# Patient Record
Sex: FEMALE | Race: WHITE
[De-identification: names, ages, dates, MRNs, and addresses within clinical notes are randomized per-mention and may not be internally consistent; named-entity substitution may affect disease eponyms.]

---

## 2018-04-29 ENCOUNTER — HOSPITAL ENCOUNTER (EMERGENCY)
Dept: HOSPITAL 15 - ER | Age: 83
LOS: 1 days | Discharge: HOME | End: 2018-04-30
Payer: COMMERCIAL

## 2018-04-29 VITALS — HEIGHT: 62 IN | WEIGHT: 173.5 LBS | BODY MASS INDEX: 31.93 KG/M2

## 2018-04-29 DIAGNOSIS — M47.814: Primary | ICD-10-CM

## 2018-04-29 DIAGNOSIS — M54.42: ICD-10-CM

## 2018-04-29 DIAGNOSIS — J44.9: ICD-10-CM

## 2018-04-29 LAB
ALBUMIN SERPL-MCNC: 3.9 G/DL (ref 3.4–5)
ALP SERPL-CCNC: 140 U/L (ref 45–117)
ALT SERPL-CCNC: 27 U/L (ref 13–56)
ANION GAP SERPL CALCULATED.3IONS-SCNC: 11 MMOL/L (ref 5–15)
APTT PPP: 24.9 SEC (ref 22.64–33.71)
BILIRUB SERPL-MCNC: 0.7 MG/DL (ref 0.2–1)
BUN SERPL-MCNC: 16 MG/DL (ref 7–18)
BUN/CREAT SERPL: 16.5
CALCIUM SERPL-MCNC: 8.9 MG/DL (ref 8.5–10.1)
CHLORIDE SERPL-SCNC: 111 MMOL/L (ref 98–107)
CO2 SERPL-SCNC: 21 MMOL/L (ref 21–32)
GLUCOSE SERPL-MCNC: 138 MG/DL (ref 74–106)
HCT VFR BLD AUTO: 41.8 % (ref 36–46)
HGB BLD-MCNC: 14.6 G/DL (ref 12.2–16.2)
INR PPP: 0.94 (ref 0.9–1.15)
MAGNESIUM SERPL-MCNC: 2.3 MG/DL (ref 1.6–2.6)
MCH RBC QN AUTO: 34 PG (ref 28–32)
MCV RBC AUTO: 97.5 FL (ref 80–100)
NRBC BLD QL AUTO: 0 %
POTASSIUM SERPL-SCNC: 3.6 MMOL/L (ref 3.5–5.1)
PROT SERPL-MCNC: 7.6 G/DL (ref 6.4–8.2)
PROTHROMBIN TIME: 10.2 SEC (ref 9.37–12.3)
SODIUM SERPL-SCNC: 143 MMOL/L (ref 136–145)

## 2018-04-29 PROCEDURE — 99285 EMERGENCY DEPT VISIT HI MDM: CPT

## 2018-04-29 PROCEDURE — 36415 COLL VENOUS BLD VENIPUNCTURE: CPT

## 2018-04-29 PROCEDURE — 85025 COMPLETE CBC W/AUTO DIFF WBC: CPT

## 2018-04-29 PROCEDURE — 85730 THROMBOPLASTIN TIME PARTIAL: CPT

## 2018-04-29 PROCEDURE — 83735 ASSAY OF MAGNESIUM: CPT

## 2018-04-29 PROCEDURE — 93005 ELECTROCARDIOGRAM TRACING: CPT

## 2018-04-29 PROCEDURE — 72131 CT LUMBAR SPINE W/O DYE: CPT

## 2018-04-29 PROCEDURE — 84484 ASSAY OF TROPONIN QUANT: CPT

## 2018-04-29 PROCEDURE — 85610 PROTHROMBIN TIME: CPT

## 2018-04-29 PROCEDURE — 83880 ASSAY OF NATRIURETIC PEPTIDE: CPT

## 2018-04-29 PROCEDURE — 73030 X-RAY EXAM OF SHOULDER: CPT

## 2018-04-29 PROCEDURE — 96372 THER/PROPH/DIAG INJ SC/IM: CPT

## 2018-04-29 PROCEDURE — 71045 X-RAY EXAM CHEST 1 VIEW: CPT

## 2018-04-29 PROCEDURE — 72128 CT CHEST SPINE W/O DYE: CPT

## 2018-04-29 PROCEDURE — 80053 COMPREHEN METABOLIC PANEL: CPT

## 2018-04-30 VITALS — DIASTOLIC BLOOD PRESSURE: 37 MMHG | SYSTOLIC BLOOD PRESSURE: 111 MMHG

## 2025-01-20 ENCOUNTER — HOSPITAL ENCOUNTER (INPATIENT)
Dept: HOSPITAL 15 - SUR | Age: 89
LOS: 4 days | Discharge: HOME HEALTH | DRG: 470 | End: 2025-01-24
Attending: ORTHOPAEDIC SURGERY | Admitting: ORTHOPAEDIC SURGERY
Payer: COMMERCIAL

## 2025-01-20 VITALS
RESPIRATION RATE: 19 BRPM | TEMPERATURE: 98.2 F | OXYGEN SATURATION: 92 % | SYSTOLIC BLOOD PRESSURE: 131 MMHG | HEART RATE: 96 BPM | DIASTOLIC BLOOD PRESSURE: 69 MMHG

## 2025-01-20 VITALS — RESPIRATION RATE: 16 BRPM | OXYGEN SATURATION: 97 % | HEART RATE: 82 BPM

## 2025-01-20 VITALS
RESPIRATION RATE: 18 BRPM | DIASTOLIC BLOOD PRESSURE: 73 MMHG | SYSTOLIC BLOOD PRESSURE: 125 MMHG | TEMPERATURE: 97.2 F | OXYGEN SATURATION: 97 % | HEART RATE: 84 BPM

## 2025-01-20 VITALS — HEIGHT: 59 IN | WEIGHT: 158.51 LBS | BODY MASS INDEX: 31.96 KG/M2

## 2025-01-20 VITALS
SYSTOLIC BLOOD PRESSURE: 134 MMHG | DIASTOLIC BLOOD PRESSURE: 64 MMHG | HEART RATE: 85 BPM | OXYGEN SATURATION: 95 % | RESPIRATION RATE: 18 BRPM | TEMPERATURE: 97.3 F

## 2025-01-20 DIAGNOSIS — Z79.899: ICD-10-CM

## 2025-01-20 DIAGNOSIS — M16.12: Primary | ICD-10-CM

## 2025-01-20 DIAGNOSIS — Z79.82: ICD-10-CM

## 2025-01-20 PROCEDURE — 97116 GAIT TRAINING THERAPY: CPT

## 2025-01-20 PROCEDURE — 85014 HEMATOCRIT: CPT

## 2025-01-20 PROCEDURE — 85018 HEMOGLOBIN: CPT

## 2025-01-20 PROCEDURE — 86850 RBC ANTIBODY SCREEN: CPT

## 2025-01-20 PROCEDURE — 97110 THERAPEUTIC EXERCISES: CPT

## 2025-01-20 PROCEDURE — 8E0YXBF COMPUTER ASSISTED PROCEDURE OF LOWER EXTREMITY, WITH FLUOROSCOPY: ICD-10-PCS | Performed by: ORTHOPAEDIC SURGERY

## 2025-01-20 PROCEDURE — 80053 COMPREHEN METABOLIC PANEL: CPT

## 2025-01-20 PROCEDURE — 72170 X-RAY EXAM OF PELVIS: CPT

## 2025-01-20 PROCEDURE — 0SRB06Z REPLACEMENT OF LEFT HIP JOINT WITH OXIDIZED ZIRCONIUM ON POLYETHYLENE SYNTHETIC SUBSTITUTE, OPEN APPROACH: ICD-10-PCS | Performed by: ORTHOPAEDIC SURGERY

## 2025-01-20 PROCEDURE — 36415 COLL VENOUS BLD VENIPUNCTURE: CPT

## 2025-01-20 PROCEDURE — 86900 BLOOD TYPING SEROLOGIC ABO: CPT

## 2025-01-20 PROCEDURE — 97163 PT EVAL HIGH COMPLEX 45 MIN: CPT

## 2025-01-20 PROCEDURE — 86901 BLOOD TYPING SEROLOGIC RH(D): CPT

## 2025-01-20 PROCEDURE — 97530 THERAPEUTIC ACTIVITIES: CPT

## 2025-01-20 RX ADMIN — CEFEPIME ONE MLS/HR: 1 INJECTION, POWDER, FOR SOLUTION INTRAMUSCULAR; INTRAVENOUS at 07:43

## 2025-01-20 RX ADMIN — HYDROMORPHONE HYDROCHLORIDE PRN MG: 2 INJECTION, SOLUTION INTRAMUSCULAR; INTRAVENOUS; SUBCUTANEOUS at 11:13

## 2025-01-20 RX ADMIN — KETOROLAC TROMETHAMINE ONE MG: 30 INJECTION, SOLUTION INTRAMUSCULAR; INTRAVENOUS at 09:34

## 2025-01-20 RX ADMIN — HYDROMORPHONE HYDROCHLORIDE PRN MG: 2 INJECTION, SOLUTION INTRAMUSCULAR; INTRAVENOUS; SUBCUTANEOUS at 16:35

## 2025-01-20 RX ADMIN — PROPRANOLOL HYDROCHLORIDE SCH MG: 20 TABLET ORAL at 10:00

## 2025-01-20 RX ADMIN — HYDROCODONE BITARTRATE AND ACETAMINOPHEN PRN TAB: 5; 325 TABLET ORAL at 23:09

## 2025-01-20 RX ADMIN — CEFAZOLIN ONE MLS/HR: 2 INJECTION, POWDER, FOR SOLUTION INTRAMUSCULAR; INTRAVENOUS at 07:04

## 2025-01-20 RX ADMIN — ONDANSETRON HYDROCHLORIDE PRN MG: 2 INJECTION, SOLUTION INTRAMUSCULAR; INTRAVENOUS at 16:36

## 2025-01-20 RX ADMIN — MORPHINE SULFATE ONE MG: 0.5 INJECTION EPIDURAL; INTRATHECAL; INTRAVENOUS at 09:35

## 2025-01-20 RX ADMIN — SODIUM CHLORIDE SCH ML: 9 INJECTION INTRAMUSCULAR; INTRAVENOUS; SUBCUTANEOUS at 14:00

## 2025-01-20 RX ADMIN — DOCUSATE SODIUM SCH MG: 100 CAPSULE, LIQUID FILLED ORAL at 10:00

## 2025-01-20 RX ADMIN — ROPIVACAINE HYDROCHLORIDE ONE MG: 5 INJECTION, SOLUTION EPIDURAL; INFILTRATION; PERINEURAL at 09:23

## 2025-01-20 RX ADMIN — BUPIVACAINE HYDROCHLORIDE ONE ML: 2.5 INJECTION, SOLUTION INFILTRATION; PERINEURAL at 09:33

## 2025-01-20 RX ADMIN — CEFEPIME SCH MLS/HR: 1 INJECTION, POWDER, FOR SOLUTION INTRAMUSCULAR; INTRAVENOUS at 10:00

## 2025-01-20 RX ADMIN — SODIUM CHLORIDE, SODIUM LACTATE, POTASSIUM CHLORIDE, AND CALCIUM CHLORIDE SCH MLS/HR: .6; .31; .03; .02 INJECTION, SOLUTION INTRAVENOUS at 10:30

## 2025-01-20 RX ADMIN — ONDANSETRON HYDROCHLORIDE ONE MG: 2 INJECTION, SOLUTION INTRAMUSCULAR; INTRAVENOUS at 11:04

## 2025-01-20 RX ADMIN — ATORVASTATIN CALCIUM SCH MG: 20 TABLET, FILM COATED ORAL at 23:09

## 2025-01-20 RX ADMIN — SODIUM CHLORIDE ONE MLS/HR: 9 INJECTION, SOLUTION INTRAVENOUS at 06:51

## 2025-01-20 RX ADMIN — LATANOPROST SCH DROP: 50 SOLUTION/ DROPS OPHTHALMIC at 17:57

## 2025-01-20 RX ADMIN — CEFAZOLIN SODIUM SCH MLS/HR: 1 INJECTION, SOLUTION INTRAVENOUS at 08:45

## 2025-01-20 RX ADMIN — DEXTROSE ONE MG: 5 SOLUTION INTRAVENOUS at 09:35

## 2025-01-20 NOTE — DVH
EXAM: XY PELVIS AP



CLINICAL INDICATION: sp Left MONCHO



TECHNIQUE:    XY PELVIS AP



Comparison: None



FINDINGS/IMPRESSION:



There is no evidence of acute fracture or dislocation.



Left hip arthroplasty. Severe right hip osteoarthritis



The alignment is anatomical.



There is no radiopaque foreign body.



Electronically Signed by: Vinod Pineda at 01/20/2025 10:04:18 AM

## 2025-01-21 VITALS
DIASTOLIC BLOOD PRESSURE: 57 MMHG | HEART RATE: 90 BPM | RESPIRATION RATE: 18 BRPM | TEMPERATURE: 98.4 F | OXYGEN SATURATION: 94 % | SYSTOLIC BLOOD PRESSURE: 125 MMHG

## 2025-01-21 VITALS
OXYGEN SATURATION: 92 % | RESPIRATION RATE: 16 BRPM | TEMPERATURE: 99 F | HEART RATE: 83 BPM | SYSTOLIC BLOOD PRESSURE: 46 MMHG

## 2025-01-21 VITALS
RESPIRATION RATE: 17 BRPM | TEMPERATURE: 99.2 F | OXYGEN SATURATION: 96 % | DIASTOLIC BLOOD PRESSURE: 38 MMHG | HEART RATE: 83 BPM | SYSTOLIC BLOOD PRESSURE: 112 MMHG

## 2025-01-21 VITALS
RESPIRATION RATE: 19 BRPM | DIASTOLIC BLOOD PRESSURE: 45 MMHG | SYSTOLIC BLOOD PRESSURE: 118 MMHG | HEART RATE: 74 BPM | TEMPERATURE: 98.3 F | OXYGEN SATURATION: 92 %

## 2025-01-21 VITALS
SYSTOLIC BLOOD PRESSURE: 129 MMHG | OXYGEN SATURATION: 93 % | TEMPERATURE: 99 F | DIASTOLIC BLOOD PRESSURE: 50 MMHG | RESPIRATION RATE: 17 BRPM | HEART RATE: 74 BPM

## 2025-01-21 VITALS
HEART RATE: 95 BPM | DIASTOLIC BLOOD PRESSURE: 48 MMHG | RESPIRATION RATE: 19 BRPM | TEMPERATURE: 98.1 F | SYSTOLIC BLOOD PRESSURE: 100 MMHG | OXYGEN SATURATION: 92 %

## 2025-01-21 LAB
ALBUMIN SERPL-MCNC: 3.5 G/DL (ref 3.2–4.8)
ALP SERPL-CCNC: 110 U/L (ref 46–116)
ALT SERPL-CCNC: 19 U/L (ref 7–40)
ANION GAP SERPL CALCULATED.3IONS-SCNC: 8 MMOL/L (ref 5–15)
BILIRUB SERPL-MCNC: 0.9 MG/DL (ref 0.2–1)
BUN SERPL-MCNC: 23 MG/DL (ref 9–23)
BUN/CREAT SERPL: 20.4 (ref 10–20)
CALCIUM SERPL-MCNC: 9.1 MG/DL (ref 8.7–10.4)
CHLORIDE SERPL-SCNC: 108 MMOL/L (ref 98–107)
CO2 SERPL-SCNC: 25 MMOL/L (ref 20–31)
GLUCOSE SERPL-MCNC: 85 MG/DL (ref 74–106)
HCT VFR BLD AUTO: 29.5 % (ref 36–46)
HGB BLD-MCNC: 10 G/DL (ref 12.2–16.2)
POTASSIUM SERPL-SCNC: 3.6 MMOL/L (ref 3.5–5.1)
PROT SERPL-MCNC: 5.6 G/DL (ref 5.7–8.2)
SODIUM SERPL-SCNC: 141 MMOL/L (ref 136–145)

## 2025-01-21 RX ADMIN — ACETAMINOPHEN PRN MG: 325 TABLET ORAL at 18:47

## 2025-01-21 RX ADMIN — LEVOTHYROXINE SODIUM SCH MCG: 25 TABLET ORAL at 06:48

## 2025-01-21 RX ADMIN — CEFAZOLIN SODIUM SCH MLS/HR: 1 INJECTION, SOLUTION INTRAVENOUS at 00:06

## 2025-01-21 RX ADMIN — ENOXAPARIN SODIUM SCH MG: 40 INJECTION SUBCUTANEOUS at 09:11

## 2025-01-21 NOTE — DVH
EXAM: XY PELVIS AP



CLINICAL INDICATION: sp Left MONCHO



TECHNIQUE:    XY PELVIS AP



Comparison: XY PELVIS AP on DOS: 1/20/25



FINDINGS/IMPRESSION:



There is no evidence of acute fracture or dislocation.



Left total hip arthroplasty.



The alignment is anatomical.



There is no radiopaque foreign body.



Electronically Signed by: Vinod Pineda at 01/21/2025 08:22:20 AM

## 2025-01-21 NOTE — DVHPN2
Progress Note


Date Seen:  Jan 21, 2025


Medical Necessity Reason


Pt with a Central, PICC or Fol:  No





Subjective


Patient reports:  No new complaints (pain in left hip as expected)





Objective


vital signs





                                   Vital Sign








  Date Time  Temp Pulse Resp B/P (MAP) Pulse Ox O2 Delivery O2 Flow Rate FiO2


 


1/21/25 09:17  91  124/47    


 


1/21/25 09:00 99.2  17  96   





 99.2       


 


1/21/25 08:00      Room Air* 0 21














                           Total Intake and Output   


 


 1/20/25 1/20/25 1/21/25





 15:00 23:00 07:00


 


Intake Total  500 ml 200 ml


 


Output Total  200 ml 800 ml


 


Balance  300 ml -600 ml








medications





                               Current Medications








 Medications  Dose


 Ordered  Sig/Reyes


 Route  Start Time


 Stop Time Status Last Admin


Dose Admin


 


 Latanoprost  1 drop  QPM


 [facility]  1/20/25 18:00


    1/20/25 17:57


1 DROP


 


 Levothyroxine


 Sodium  25 mcg  QAM


 PO  1/21/25 07:00


    1/21/25 06:48


25 MCG


 


 Atorvastatin


 Calcium  40 mg  HS


 PO  1/20/25 22:00


    1/20/25 23:09


40 MG


 


 Hydrochlorothiazide  12.5 mg  DAILY


 PO  1/20/25 10:00


    1/21/25 09:16


12.5 MG


 


 Propranolol HCl  10 mg  BID


 PO  1/20/25 10:00


    1/21/25 09:17


10 MG


 


 Lactated Ringer's  1,000 ml @ 


 100 mls/hr  Q10H


 IV  1/20/25 08:45


    1/21/25 06:48


100 MLS/HR


 


 Sodium Chloride  10 ml  Q8HR


 IV  1/20/25 14:00


    1/21/25 06:48


10 ML


 


 Acetaminophen/


 Hydrocodone Bitart  1 tab  Q4HP  PRN


 PO  1/20/25 08:45


    1/21/25 09:14


1 TAB


 


 Hydromorphone HCl  1 mg  Q2HP  PRN


 IV  1/20/25 08:45


    1/20/25 16:35


1 MG


 


 Ondansetron HCl  4 mg  Q6HP  PRN


 IV  1/20/25 08:45


    1/20/25 16:36


4 MG


 


 Docusate Sodium  100 mg  Q12HR


 PO  1/20/25 10:00


    1/21/25 09:11


100 MG


 


 Enoxaparin Sodium  40 mg  DAILY


 SC  1/21/25 10:00


    1/21/25 09:11


40 MG


 


 Nitroglycerin  0.4 mg  Q5MINP  PRN


 SL  1/20/25 08:45


     





 


 Morphine Sulfate  2 mg  Q30M  PRN


 IV  1/20/25 08:45


     





 


 Cefepime HCl  50 ml @ 


 12.5 mls/hr  DAILY


 IV  1/20/25 10:00


    1/21/25 09:11


12.5 MLS/HR


 


 Tramadol HCl  50 mg  Q4HP  PRN


 PO  1/20/25 08:45


     





 


 Acetaminophen  650 mg  Q6HP  PRN


 PO  1/20/25 09:15


     











Examination:  GENERAL:Normal, MSK:Abnormal


laboratory and microbiology


                                Laboratory Tests


1/21/25 05:27

















Test


 1/21/25


05:27 Range/Units


 


 


Serum Glucose 85    mg/dL











Problem List/Assessment/Plan


Problem List/Assessment/Plan


89 yo F sp Left MONCHO POD 1





WBAT with walker





PT





pain control





dc planning for snf vs home





labs stable





on oxygen - wean as tolerated


Plan discussed with:  Patient





My Orders


My Orders





                            Orders - SWETHA HOLMAN MD








Procedure Category Date Status





  Time 


 


Pelvis Ap XY 1/21/25 Resulted





  06:05 

















SWETHA HOLMAN MD                Jan 21, 2025 15:26

## 2025-01-21 NOTE — DVHOP2
Operative Report - 2


Report Details


Date:


1/20/25


Preop Diagnosis:


Left hip osteoarthritis


Postop Diagnosis:  


Left hip osteoarthritis


Surgeon:


Scot Pineda MD


Anesthesiologist:


Debora LANZA


Anesthesia:  Regional


Consent:


The patient was informed of the risks and benefits of the procedure. These 

include but are not limited to death complications of anesthesia, postoperative 

infection, incomplete relief of symptoms, recurrence of symptoms, damage to 

blood vessels, nerves and tendons, deep venous thrombosis, pulmonary embolism 

and possible need for repeat surgery in the future.


Estimated Blood Loss:


300 cc





Name of Procedure Performed


Left total hip arthroplasty with computer navigation





Procedure Details


Procedure Details:


FINDINGS: Extensive degenerative disease with grade IV changes











INDICATION: This patient has failed non-operative treatments for hip arthritis 

and is now indicated for a total hip replacement. Preoperatively in the waiting 

area as well as in the office, I had a long discussion with the patient 

regarding the plan, the expected outcome, the risks, benefits, and alternatives 

of surgery. The risks include, but are not limited to, infection (which may 

require future surgery and removal of implants) , bleeding (which may require a 

transfusion), damage to nerves, arteries, veins, tendons, muscles and other 

adjacent structures. Also discussed the possibilities of dislocation, leg-length

discrepancy, intraoperative fractures, implant loosening, heterotopic bone 

formation, and revision for variety of reasons, and medical complications etc. 

This was discussed at length and consent has been obtained. 























DESCRIPTION OF PROCEDURE: 





   In the preoperative holding area, the consent was reviewed and the 

appropriate extremity was verified by the patient and marked with my initials. 

The patient was then transferred to the operating theatre.  Appropriate 

anesthetia was induced. All bony prominences were well padded. A time out was 

performed verifying the side and site of surgery according to standard protocol.

Preoperative antibiotics were given. Tranexamic acid was given. 





   The patient was then placed in the lateral decubitus position and fixed with 

rigid pelvic fixation. All bony prominences were well padded and an axillary 

roll was placed.  The affected hip area was then prepped and draped in the usual

sterile fashion. 





   Using an 11-blade, three stab incisions were made over the iliac crest. Two 

threaded guide pins were inserted into the crest confirming to be in bone. The 

pelvic array was attached to the pins and tightened. 





    We made a standard posterolateral incision sharply through the skin and 

carried our dissection down through subcutaneous tissue to the underlying fascia

achieving hemostasis where necessary. We incised the fascia in line with our 

incision. We identified and protected the sciatic nerve.  We took down the 

external rotators and hip capsule from their insertion into the greater 

trochanter, tagged them and retracted them posteriorly for further protection of

the sciatic nerve. 





   A check point was placed into the greater trochanter and the hip center and 

leg length length were registered. We then dislocated the femoral head and 

performed an osteotomy of the femoral neck  in accordance with our pre-operative

plan.  The labrum was excised with a long-handle knife, and we exposed the 

acetabular rim and cotyloid fossa. 





   We then reamed up to our final size in accordance with the preoperative plan.

 We copiously irrigated and then impacted the final cup into position. We 

confirmed the position with the robotic navigation guidance. We placed  one dome

screws into the posterior-superior quadrant in the usual fashion.  We irrigated 

the cup and impacted the liner, checking to make sure it was well seated. 





   Attention was then turned to the femur. We used a box osteotome followed by a

canal finder to gain entry to the canal. Intramedullary contents were suctioned 

and care was taken to ensure they did not touch the tissues. We sequentially 

reamed until good cortical contact, then broached up to out final size. We 

trialed with the appropriate femoral neck and head and reduced the hip.  The hip

was taken through a full range of motion. The hip soft tissues were examined in 

extension and external rotation, the anterior capsule and IT band were palpated,

and combined anteversion was determined to be 40 degrees. The hip was stable at 

maximum flexion, at 90 degrees of flexion and 45 degrees of internal rotation 

and the position of sleep. Leg lengths were restored as shown using the computer

navigation, and the trial LTC matched preoperative and intraoperative 

templating.





   The hip was then dislocated and trial components removed.  We copiously 

irrigated the wound and impacted the final femoral stem into position.  The 

femoral head was impacted onto a clean and dry trunion and confirmed to be 

seated. The hip was reduced ensuring to tissues in the acetabular cup. We again 

brought it through a full functional range of motion and there was no evidence 

for dislocation, instability, or impingement. The checkpoint was removed. A 

dilute betadine solution (17.5mL in 500mL saline) was used to wash the joint and

left to sit for 3 minutes. This was then irrigated out with copious amounts of 

pulse lavage. We sprinkled 1g vancomycin powder below the fascia and 1g above 

the fascia. We copiously irrigated the wound and soft tissues.  The short 

external rotators and capsule were repaired to the greater trochanter through 

drill holes, and the quadratus was repaired. We palpated the sciatic nerve in 

continuity without tension.  The fascia was closed with vicryl and a barbed 

suture. We closed over the fascia with vicryl suture and re-approximated the 

skin with staples.  A sterile dressing was placed. 





     We returned the patient to the supine position.  We verified all lower 

extremity compartments were soft and compressible and that we had intact distal 

pulses and checked our leg length restoration. the patient was then transferred 

to the recovery room in stable condition.





Condition


Good





Disposition








 Still a Patient

















SCOT PINEDA MD                Jan 21, 2025 06:12

## 2025-01-22 VITALS
RESPIRATION RATE: 16 BRPM | DIASTOLIC BLOOD PRESSURE: 49 MMHG | SYSTOLIC BLOOD PRESSURE: 131 MMHG | HEART RATE: 80 BPM | TEMPERATURE: 98.5 F | OXYGEN SATURATION: 92 %

## 2025-01-22 VITALS
TEMPERATURE: 98.2 F | OXYGEN SATURATION: 86 % | DIASTOLIC BLOOD PRESSURE: 51 MMHG | HEART RATE: 80 BPM | SYSTOLIC BLOOD PRESSURE: 135 MMHG | RESPIRATION RATE: 20 BRPM

## 2025-01-22 VITALS
TEMPERATURE: 98.8 F | OXYGEN SATURATION: 90 % | SYSTOLIC BLOOD PRESSURE: 140 MMHG | RESPIRATION RATE: 18 BRPM | DIASTOLIC BLOOD PRESSURE: 56 MMHG | HEART RATE: 91 BPM

## 2025-01-22 VITALS
HEART RATE: 70 BPM | DIASTOLIC BLOOD PRESSURE: 50 MMHG | RESPIRATION RATE: 17 BRPM | SYSTOLIC BLOOD PRESSURE: 127 MMHG | TEMPERATURE: 98.1 F | OXYGEN SATURATION: 100 %

## 2025-01-22 VITALS
OXYGEN SATURATION: 90 % | RESPIRATION RATE: 18 BRPM | DIASTOLIC BLOOD PRESSURE: 56 MMHG | HEART RATE: 84 BPM | TEMPERATURE: 98.4 F | SYSTOLIC BLOOD PRESSURE: 138 MMHG

## 2025-01-22 VITALS
SYSTOLIC BLOOD PRESSURE: 137 MMHG | HEART RATE: 75 BPM | OXYGEN SATURATION: 96 % | DIASTOLIC BLOOD PRESSURE: 45 MMHG | RESPIRATION RATE: 19 BRPM | TEMPERATURE: 97.9 F

## 2025-01-22 LAB
HCT VFR BLD AUTO: 29.5 % (ref 36–46)
HGB BLD-MCNC: 9.9 G/DL (ref 12.2–16.2)

## 2025-01-23 VITALS
RESPIRATION RATE: 16 BRPM | OXYGEN SATURATION: 98 % | TEMPERATURE: 98.2 F | SYSTOLIC BLOOD PRESSURE: 119 MMHG | HEART RATE: 73 BPM | DIASTOLIC BLOOD PRESSURE: 47 MMHG

## 2025-01-23 VITALS
SYSTOLIC BLOOD PRESSURE: 127 MMHG | RESPIRATION RATE: 20 BRPM | HEART RATE: 71 BPM | OXYGEN SATURATION: 93 % | TEMPERATURE: 98.1 F | DIASTOLIC BLOOD PRESSURE: 42 MMHG

## 2025-01-23 VITALS
DIASTOLIC BLOOD PRESSURE: 53 MMHG | TEMPERATURE: 98.4 F | SYSTOLIC BLOOD PRESSURE: 118 MMHG | HEART RATE: 71 BPM | RESPIRATION RATE: 16 BRPM | OXYGEN SATURATION: 93 %

## 2025-01-23 VITALS
HEART RATE: 62 BPM | RESPIRATION RATE: 17 BRPM | SYSTOLIC BLOOD PRESSURE: 121 MMHG | TEMPERATURE: 97.6 F | DIASTOLIC BLOOD PRESSURE: 49 MMHG | OXYGEN SATURATION: 94 %

## 2025-01-23 VITALS
OXYGEN SATURATION: 94 % | SYSTOLIC BLOOD PRESSURE: 112 MMHG | RESPIRATION RATE: 17 BRPM | DIASTOLIC BLOOD PRESSURE: 54 MMHG | HEART RATE: 68 BPM | TEMPERATURE: 97.9 F

## 2025-01-23 VITALS
HEART RATE: 76 BPM | DIASTOLIC BLOOD PRESSURE: 79 MMHG | OXYGEN SATURATION: 95 % | TEMPERATURE: 98.3 F | RESPIRATION RATE: 17 BRPM | SYSTOLIC BLOOD PRESSURE: 127 MMHG

## 2025-01-23 LAB
HCT VFR BLD AUTO: 29.4 % (ref 36–46)
HGB BLD-MCNC: 9.8 G/DL (ref 12.2–16.2)

## 2025-01-23 NOTE — DVHPN2
Progress Note


Date Seen:  Jan 22, 2025


Medical Necessity Reason


Pt with a Central, PICC or Fol:  No





Subjective


Patient reports:  Other (pateint up out of bed once with therapy)





Objective


vital signs





                                   Vital Sign








  Date Time  Temp Pulse Resp B/P (MAP) Pulse Ox O2 Delivery O2 Flow Rate FiO2


 


1/23/25 00:58 98.3 76 17 127/79 (95) 95   





 98.3       


 


1/22/25 20:00      Room Air* 0 21














                           Total Intake and Output   


 


 1/22/25 1/22/25 1/23/25





 15:00 23:00 07:00


 


Intake Total  200 ml 


 


Balance  200 ml 








medications





                               Current Medications








 Medications  Dose


 Ordered  Sig/Reyes


 Route  Start Time


 Stop Time Status Last Admin


Dose Admin


 


 Latanoprost  1 drop  QPM


 [facility]  1/20/25 18:00


    1/22/25 18:07


1 DROP


 


 Levothyroxine


 Sodium  25 mcg  QAM


 PO  1/21/25 07:00


    1/22/25 06:04


25 MCG


 


 Atorvastatin


 Calcium  40 mg  HS


 PO  1/20/25 22:00


    1/22/25 22:04


40 MG


 


 Hydrochlorothiazide  12.5 mg  DAILY


 PO  1/20/25 10:00


    1/22/25 09:42


12.5 MG


 


 Propranolol HCl  10 mg  BID


 PO  1/20/25 10:00


    1/22/25 22:01


10 MG


 


 Lactated Ringer's  1,000 ml @ 


 100 mls/hr  Q10H


 IV  1/20/25 08:45


    1/22/25 00:45


100 MLS/HR


 


 Sodium Chloride  10 ml  Q8HR


 IV  1/20/25 14:00


    1/22/25 22:06


10 ML


 


 Acetaminophen/


 Hydrocodone Bitart  1 tab  Q4HP  PRN


 PO  1/20/25 08:45


    1/22/25 10:27


1 TAB


 


 Hydromorphone HCl  1 mg  Q2HP  PRN


 IV  1/20/25 08:45


    1/22/25 15:07


1 MG


 


 Ondansetron HCl  4 mg  Q6HP  PRN


 IV  1/20/25 08:45


    1/22/25 18:44


4 MG


 


 Docusate Sodium  100 mg  Q12HR


 PO  1/20/25 10:00


    1/22/25 22:04


100 MG


 


 Enoxaparin Sodium  40 mg  DAILY


 SC  1/21/25 10:00


    1/22/25 09:41


40 MG


 


 Nitroglycerin  0.4 mg  Q5MINP  PRN


 SL  1/20/25 08:45


     





 


 Morphine Sulfate  2 mg  Q30M  PRN


 IV  1/20/25 08:45


     





 


 Cefepime HCl  50 ml @ 


 12.5 mls/hr  DAILY


 IV  1/20/25 10:00


    1/22/25 09:45


12.5 MLS/HR


 


 Tramadol HCl  50 mg  Q4HP  PRN


 PO  1/20/25 08:45


     





 


 Acetaminophen  650 mg  Q6HP  PRN


 PO  1/20/25 09:15


    1/21/25 18:47


650 MG








Examination:  GENERAL:Normal, MSK:Abnormal


laboratory and microbiology


                                Laboratory Tests


1/22/25 05:28








1/21/25 05:27

















Test


 1/21/25


05:27 Range/Units


 


 


Serum Glucose 85    mg/dL











Problem List/Assessment/Plan


Problem List/Assessment/Plan


88 yo F sp Left MONCHO POD 2





WBAT with walker





PT





pain control





dc planning for snf vs home





labs stable





 out of bed ambulate/ chair as much as possible


Plan discussed with:  Patient





My Orders


My Orders





                            Orders - SWETHA HOLMAN MD








Procedure Category Date Status





  Time 


 


Out Of Bed Ambulate MARCELINO 1/23/25 Verified





  05:18 


 


Up In Chair Qid MARCELINO 1/23/25 Verified





  05:18 


 


*  CONS 1/23/25 Verified





Consult   

















SWETHA HOLMAN MD                Jan 23, 2025 05:20

## 2025-01-24 VITALS
DIASTOLIC BLOOD PRESSURE: 58 MMHG | SYSTOLIC BLOOD PRESSURE: 131 MMHG | OXYGEN SATURATION: 98 % | TEMPERATURE: 97.8 F | HEART RATE: 71 BPM | RESPIRATION RATE: 17 BRPM

## 2025-01-24 VITALS
DIASTOLIC BLOOD PRESSURE: 59 MMHG | HEART RATE: 56 BPM | OXYGEN SATURATION: 99 % | RESPIRATION RATE: 17 BRPM | SYSTOLIC BLOOD PRESSURE: 137 MMHG | TEMPERATURE: 99.6 F

## 2025-01-24 VITALS
DIASTOLIC BLOOD PRESSURE: 44 MMHG | HEART RATE: 81 BPM | SYSTOLIC BLOOD PRESSURE: 147 MMHG | TEMPERATURE: 98.4 F | OXYGEN SATURATION: 97 % | RESPIRATION RATE: 20 BRPM

## 2025-01-24 VITALS
HEART RATE: 72 BPM | TEMPERATURE: 97.8 F | DIASTOLIC BLOOD PRESSURE: 47 MMHG | SYSTOLIC BLOOD PRESSURE: 147 MMHG | RESPIRATION RATE: 16 BRPM | OXYGEN SATURATION: 100 %

## 2025-01-24 VITALS — HEART RATE: 72 BPM | OXYGEN SATURATION: 100 % | RESPIRATION RATE: 16 BRPM

## 2025-01-24 VITALS
SYSTOLIC BLOOD PRESSURE: 125 MMHG | HEART RATE: 76 BPM | OXYGEN SATURATION: 92 % | DIASTOLIC BLOOD PRESSURE: 38 MMHG | TEMPERATURE: 98.3 F | RESPIRATION RATE: 16 BRPM

## 2025-01-29 NOTE — DVHDS2
Discharge Note


Date of Service:  Jan 24, 2025


Discharge Diagnosis


Discharge Diagnosis:  


sp Left total hip arthroplasty





HPI/Discussion


HPI/Discussion


88 yo F sp Left MONCHO on 1/21/25 -- patient doing well/ tolerating PO diet/ PO 

pain meds; patient is ready to go home





Home Medications


Reported Medications


Multiple Vitamins W/ Minerals (PRESERVISION AREDS 2) Unknown Strength Cap, PO, 

CAP


   1/15/25


Latanoprost (LATANOPROST) 0.005 % Sol, 1 DROP [facility] QPM, #7.5 ML 3 Refills


   1/15/25


Atorvastatin Calcium (ATORVASTATIN CALCIUM) 40 Mg Tab, 1 TAB PO DAILY, #30 TAB 5

Refills


   1/15/25


Levothyroxine Sodium (Levothyroxine Sodium) 25 Mcg Tab, 25 MCG PO QAM, MCG


   1/15/25


Aspirin (Aspirin 81 Low Dose) 81 Mg Chw, 81 MG PO DAILY, TAB.CHEW


   1/15/25


Amlodipine Besylate-Benazepril (Lotrel) Unknown Strength Cap, PO DAILY, #30 CAP 

5 Refills


   1/15/25


Propranolol HCl (Propranolol Hydrochloride) 10 Mg Tab, 10 MG PO BID, TAB


   1/15/25


Hydrochlorothiazide (Hydrochlorothiazide) 12.5 Mg Cap, 1 CAP PO DAILY, #30 CAP 5

Refills


   1/15/25


Brinzolamide-Brimonidine Tartr (SIMBRINZA) Unknown Strength Jaki, OP, ML


   1/15/25





Discharge Summary


Discharge summary


88 yo F sp Left MONCHO on 1/21/25 -- patient doing well/ tolerating PO diet/ PO 

pain meds; patient is ready to go home





Disposition


Disposition:  Discharge home/self care


Disposition condition:  Good


Discharge instructions


WBAT with walker


pain meds sent to pharmacy


Incentive spriometer


Home PT/nursing setup


fu with Dr. Pineda in 2 weeks


Visit Coding Complete:  Yes











SWETHA PINEDA MD                Jan 29, 2025 08:08